# Patient Record
Sex: MALE | Race: OTHER
[De-identification: names, ages, dates, MRNs, and addresses within clinical notes are randomized per-mention and may not be internally consistent; named-entity substitution may affect disease eponyms.]

---

## 2021-07-04 ENCOUNTER — HOSPITAL ENCOUNTER (EMERGENCY)
Dept: HOSPITAL 41 - JD.ED | Age: 36
Discharge: HOME | End: 2021-07-04
Payer: COMMERCIAL

## 2021-07-04 DIAGNOSIS — F17.200: ICD-10-CM

## 2021-07-04 DIAGNOSIS — T17.908A: Primary | ICD-10-CM
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
## 2021-07-05 NOTE — CR
Chest: PA and lateral views of the chest were obtained.

 

Comparison: Prior chest x-ray of 07/29/19.

 

Heart size and mediastinum are within normal limits.  Lungs are clear 

with no acute parenchymal change.  No acute osseous abnormality is 

appreciated.

 

Impression:

1.  Nothing acute is seen on 2 view chest x-ray.

 

Diagnostic code #1